# Patient Record
Sex: FEMALE | Race: WHITE
[De-identification: names, ages, dates, MRNs, and addresses within clinical notes are randomized per-mention and may not be internally consistent; named-entity substitution may affect disease eponyms.]

---

## 2019-11-25 ENCOUNTER — HOSPITAL ENCOUNTER (EMERGENCY)
Dept: HOSPITAL 25 - UCEAST | Age: 44
Discharge: HOME | End: 2019-11-25
Payer: COMMERCIAL

## 2019-11-25 VITALS — DIASTOLIC BLOOD PRESSURE: 94 MMHG | SYSTOLIC BLOOD PRESSURE: 175 MMHG

## 2019-11-25 DIAGNOSIS — Z88.5: ICD-10-CM

## 2019-11-25 DIAGNOSIS — F17.200: ICD-10-CM

## 2019-11-25 DIAGNOSIS — I10: ICD-10-CM

## 2019-11-25 DIAGNOSIS — K08.89: Primary | ICD-10-CM

## 2019-11-25 PROCEDURE — 96372 THER/PROPH/DIAG INJ SC/IM: CPT

## 2019-11-25 PROCEDURE — 99212 OFFICE O/P EST SF 10 MIN: CPT

## 2019-11-25 PROCEDURE — G0463 HOSPITAL OUTPT CLINIC VISIT: HCPCS

## 2019-11-25 NOTE — UC
UC Dental HPI





- HPI Summary


HPI Summary: 





45 yo female with worsening left sided dental pain x days


has not seen a dentist for >10 yrs


hx htn


no fever


multiple missing and cracked teeth


grinds teeth at night


no facial swelling





- History of Current Complaint


Chief Complaint: UCDentalProblem


Stated Complaint: tooth ache


Time Seen by Provider: 11/25/19 15:41


Onset/Duration: Gradual Onset, Lasting Weeks, Worse Since - 3-4 days


Severity: Severe


Pain Intensity: 8


Pain Scale Used: 0-10 Numeric


Aggravating Factor(s): Heat, Cold, Chewing


Alleviating Factor(s): Nothing





- Allergies/Home Medications


Allergies/Adverse Reactions: 


 Allergies











Allergy/AdvReac Type Severity Reaction Status Date / Time


 


MS Codeine [Codeine] Allergy  heart races Verified 10/28/14 07:13











Home Medications: 


 Home Medications





Blood Preesure  11/25/19 [History]


Escitalopram * [Lexapro *] 1 tab PO DAILY 11/25/19 [History Confirmed 11/25/19]











PMH/Surg Hx/FS Hx/Imm Hx


Previously Healthy: Yes


Cardiovascular History: Hypertension





- Surgical History


Surgical History: Yes


Surgery Procedure, Year, and Place: TUBAL.  D&C





- Family History


Known Family History: Positive: Hypertension


   Negative: Diabetes





- Social History


Alcohol Use: Daily


Substance Use Type: None


Smoking Status (MU): Heavy Every Day Tobacco Smoker





Review of Systems


All Other Systems Reviewed And Are Negative: Yes


Constitutional: Positive: Negative


Skin: Positive: Negative


Eyes: Positive: Negative


ENT: Positive: Dental Pain


Respiratory: Positive: Negative


Cardiovascular: Positive: Negative


Gastrointestinal: Positive: Negative


Genitourinary: Positive: Negative


Motor: Positive: Negative


Neurovascular: Positive: Negative


Musculoskeletal: Positive: Negative


Neurological: Positive: Negative


Psychological: Positive: Negative





Physical Exam


Triage Information Reviewed: Yes


Appearance: Well-Appearing, No Pain Distress, Well-Nourished


Vital Signs: 


 Initial Vital Signs











Temp  98.8 F   11/25/19 15:24


 


Pulse  98   11/25/19 15:24


 


Resp  16   11/25/19 15:24


 


BP  175/94   11/25/19 15:24


 


Pulse Ox  98   11/25/19 15:24











Vital Signs Reviewed: Yes


Eyes: Positive: Conjunctiva Clear


ENT: Positive: Hearing grossly normal, Uvula midline.  Negative: Nasal 

congestion, Nasal drainage, Tonsillar swelling, Tonsillar exudate, Trismus, 

Muffled voice, Hoarse voice


Dental Exam: Other - abysmal dentition


Neck: Positive: Supple, Enlarged Nodes @ - LEFT ANT CERVICAL/TENDER


Respiratory: Positive: Lungs clear, Normal breath sounds, No respiratory 

distress, No accessory muscle use


Cardiovascular: Positive: RRR, No Murmur


Abdomen Description: Positive: Nontender, No Organomegaly, Soft


Bowel Sounds: Positive: Present


Musculoskeletal: Positive: ROM Intact, No Edema


Neurological: Positive: Alert, Muscle Tone Normal


Psychological Exam: Normal


Skin Exam: Normal





Dental Complaint Course/Dx





- Differential Dx/Diagnosis


Provider Diagnosis: 


 Dentalgia








Discharge ED





- Sign-Out/Discharge


Documenting (check all that apply): Patient Departure


All imaging exams completed and their final reports reviewed: No Studies





- Discharge Plan


Condition: Stable


Disposition: HOME


Prescriptions: 


Naproxen [Naproxen 500 mg tab] 500 mg PO BID PRN #20 tablet


 PRN Reason: Pain


Patient Education Materials:  Toothache (ED)


Referrals: 


Rosey Geiger MD [Primary Care Provider] - 





- Billing Disposition and Condition


Condition: STABLE


Disposition: Home

## 2019-11-25 NOTE — XMS REPORT
Continuity of Care Document (CCD)

 Created on:2019



Patient:Linnette Florentino

Sex:Female

:1975

External Reference #:[id number]





Demographics







 Address  [address] [address] [address]

 

 Home Phone  [phone]

 

 Work Phone  [phone]

 

 Preferred Language  en

 

 Marital Status  Not  or 

 

 Lutheran Affiliation  Unknown

 

 Race  White

 

 Ethnic Group  Not  or 









Author







 Name  Robe Baeza PA

 

 Address  [address] [place] [address]









Problems







 Active Problems  Provider  Date

 

 Moderate recurrent major depression  Rosey Hoffmann MD  Onset: 2011

 

 Peptic reflux disease  Rosey Hoffmann MD  Onset: 2011

 

 Generalized anxiety disorder  Meghann Powers MD  Onset: 2015

 

 Chronic sinusitis  Meghann Powers MD  Onset: 2015

 

 Tobacco user  Meghann Powers MD  Onset: 2015

 

 Bruxism (teeth grinding)  Meghann Powers MD  Onset: 2015

 

 Pure hypercholesterolemia  Meghann Powers MD  Onset: 2015







Social History







 Type  Date  Description  Comments

 

 Birth Sex    Unknown  

 

 Tobacco Use  Start: Unknown  Current Cigarette Smoker  1 Pack Daily  

 

 ETOH Use    Occasionally consumes alcohol  

 

 Tobacco Use  Start: Unknown  Heavy tobacco smoker (more than 10  



     cigarettes/day)  

 

 Smoking Status  Reviewed: 10/30/19  Heavy tobacco smoker (more than 10  



     cigarettes/day)  







Allergies, Adverse Reactions, Alerts







 Active Allergies  Reaction  Severity  Comments  Date

 

 Tylenol #3      Heart Races  2005







Medications







 Active Medications  SIG  Qnty  Indications  Ordering  Date



         Provider  

 

 Montelukast Sodium  1 by mouth every  90tabs  J30.9  Rosey Hoffmann  10/30/
2019



                  10mg  day      MD DIONNE  



 Tablets          



           

 

 Cetirizine  1 po q day  90tabs  J30.9  Rosey Hoffmann  10/30/2019



 HCL/Pseudoephedrine        MD DIONNE  



 HCL ER          



      5-120mg Tablets          



 ER 12HR          



           

 

 Albuterol Sulfate HFA  Inhale 2 Puffs  8.5units  R06.2  Jessie Velez  2019



   By Mouth 4 Times      C, RN MS FNP  



 108(90Base) mcg/Act  Daily as Needed        



 Aerosol          



           

 

 Amlodipine Besylate  Take 1 Tablet By  30tabs  I10  Robe Baeza PA  2018



                   5mg  Mouth Every Day        



 Tablets          



           

 

 Metoprolol Succinate  Take 1 Tablet By  30tabs  I10  Jessie Velez  2018



 ER  Mouth Every Day      ROMINA RN MS FNP  



  50mg Tablets ER 24HR          



           

 

 Propranolol HCL  1 tabs every 6  30tabs  F41.1  Jessie Velez  2018



               10mg  hours as needed      SCOTT VANEGAS MS SEANP  



 Tablets  for anxiety        



           

 

 Citalopram  Take 1 & 1/2  45tabs  F33.1  Jessie Velez  2017



 Hydrobromide  Tablets By Mouth      ROMINA RN MS FNP  



            20mg  Every Day        



 Tablets          



           

 

 Fluticasone  two spray per  16gm  J30.9  Jessie Velez  2017



 Propionate  each nostril      ROMINA RN MS FNP  



          50mcg/Act  every day        



 Suspension          



           

 

 Chantix Starting  to start 1 week  1tabs  F17.200  Rosey Hoffmann  2015



 Month Dillon  before target      M,MD  



         0.5mg X 11 &  quit day; day        



 1 mg X 42 Tablets  1-3: 0.5mg qday;        



   day 4-7: 0.5mg        



   bid; day 7+ 1mg        



   twice a day for        



   11 weeks        









 F17.210









 Chantix Continuing  after the starter  1pack  F17.200  Meghann Powers,  2015



 Month Dillon  pack, continue 1 tab      MD  



       1mg Tablets  twice a day for 11        



   weeks. may extend tx        



   for another 12        



   weeks.        









 F17.210







Immunizations







 CPT Code  Status  Date  Vaccine  Lot #

 

 [id number]  Given  2017  Influenza Vac, Quadrivalent, Split, 0.5mL Dosage,  
[id number]



       Im Use  

 

 [id number]  Given  04/15/2004  Immunization Td 7 Yrs Or Older  









 









 [id number]  Refused  2019  Influenza Vac, Quadrivalent, Split, 0.5mL Dosage, 
Im Use  







Vital Signs







 Date  Vital  Result  Comment

 

 10/30/2019 11:32am  Body Temperature  98.4 F  









 Weight  165.12 lb  

 

 Heart Rate  98 /min  

 

 BP Systolic  145 mmHg  

 

 BP Diastolic  85 mmHg  

 

 Height  62 inches  5'2"

 

 BMI (Body Mass Index)  30.2 kg/m2  









 2019 10:08am  Weight  154.25 lb  









 Heart Rate  88 /min  

 

 BP Systolic  127 mmHg  

 

 BP Diastolic  76 mmHg  

 

 Height  62 inches  5'2"

 

 BMI (Body Mass Index)  28.2 kg/m2  







Results







 Test  Acquired Date  Facility  Test  Result  H/L  Range  Note

 

 Laboratory test  10/30/2019  [facility]  1 Rapid Flu  NEG      



 finding      Test (In        



       House)        







Procedures







 Date  Code  Description  Status

 

 2019  [id number]  Mammogram  Completed

 

 08/15/2018  [id number]  Mammogram  Completed

 

 10/02/2017  [id number]  Mammogram  Completed







Medical Devices







 Description

 

 No Information Available







Encounters







 Description

 

 No Information Available







Assessments







 Date  Code  Description  Provider

 

 10/30/2019  R49.0  Dysphonia  Robe Baeza PA

 

 10/30/2019  J30.9  Allergic rhinitis, unspecified  Robe Baeza PA

 

 10/30/2019  E66.9  Obesity, unspecified  Robe Baeza PA

 

 10/30/2019  Z68.30  Body mass index (BMI) 30.0-30.9, adult  Robe Baeza PA







Plan of Treatment

Future Appointment(s):2019  8:20 am - Jessie Velez RN MS FNP at 
[id number] - Robe Baeza PAR49.0 DysphoniaComments:likely due to viral 
illness or allergies.  should trend towards better.  denies GERD s/s.  DDx: if 
hoarseness continues, may want to consider radiology of chest with her h/o 
smoking -- Pancoast tumor.J30.9 Allergic rhinitis, unspecifiedNew Medication:
Montelukast Sodium 10 mg - 1 by mouth every dayCetirizine HCL/Pseudoephedrine 
HCL ER 5-120 mg - 1 po q dayComments:will add singulair to allergy meds.  ?
helpE66.9 Obesity, huihkobzqzaX81.30 Body mass index (BMI) 30.0-30.9, adult



Functional Status







 Description

 

 No Information Available







Mental Status







 Description

 

 No Information Available







Referrals







 Description

 

 No Information Available

## 2019-11-25 NOTE — XMS REPORT
Continuity of Care Document (CCD)

 Created on:2019



Patient:Linnette Florentino

Sex:Female

:1975

External Reference #:[id number]





Demographics







 Address  [address] [place] [address]

 

 Home Phone  [phone]

 

 Work Phone  [phone]

 

 Preferred Language  en

 

 Marital Status  Not  or 

 

 Zoroastrianism Affiliation  Unknown

 

 Race  White

 

 Ethnic Group  Not  or 









Author







 Name  Jessie Velez, RN MS FNP

 

 Address  [address] [place] [address]









Problems







 Active Problems  Provider  Date

 

 Moderate recurrent major depression  Rosey Hoffmann MD  Onset: 2011

 

 Peptic reflux disease  Rosey Hoffmann MD  Onset: 2011

 

 Generalized anxiety disorder  Meghann Powers MD  Onset: 2015

 

 Chronic sinusitis  Meghann Powers MD  Onset: 2015

 

 Tobacco user  Meghann Powers MD  Onset: 2015

 

 Bruxism (teeth grinding)  Meghann Powers MD  Onset: 2015

 

 Pure hypercholesterolemia  Meghann Powers MD  Onset: 2015







Social History







 Type  Date  Description  Comments

 

 Birth Sex    Unknown  

 

 Tobacco Use  Start: Unknown  Current Cigarette Smoker  1 Pack Daily  

 

 ETOH Use    Occasionally consumes alcohol  

 

 Tobacco Use  Start: Unknown  Heavy tobacco smoker (more than 10  



     cigarettes/day)  

 

 Smoking Status  Reviewed: 19  Heavy tobacco smoker (more than 10  



     cigarettes/day)  







Allergies, Adverse Reactions, Alerts







 Active Allergies  Reaction  Severity  Comments  Date

 

 Tylenol #3      Heart Races  2005







Medications







 Active Medications  SIG  Qnty  Indications  Ordering  Date



         Provider  

 

 Citalopram  1 by mouth every  30tabs  F33.1  Jessie Velez  2019



 Hydrobromide  graciela VANEGAS RN MS FNP  



            40mg          



 Tablets          



           

 

 Montelukast Sodium  1 by mouth every  90tabs  J30.9  Rosey Hoffmann  10/30/
2019



                  10mg  day      MD DIONNE  



 Tablets          



           

 

 Cetirizine  1 po q day  90tabs  J30.9  Rosey Hoffmann  10/30/2019



 HCL/Pseudoephedrine        MD DIONNE  



 HCL ER          



      5-120mg Tablets          



 ER 12HR          



           

 

 Albuterol Sulfate HFA  Inhale 2 Puffs  8.5units  R06.2  Jessie Velez  2019



   By Mouth 4 Times      ROMINA RN MS FNP  



 108(90Base) mcg/Act  Daily as Needed        



 Aerosol          



           

 

 Amlodipine Besylate  Take 1 Tablet By  30tabs  I10  Robe Baeza PA  2018



                   5mg  Mouth Every Day        



 Tablets          



           

 

 Metoprolol Succinate  Take 1 Tablet By  30tabs  I10  Jessie Velez  2018



 ER  Mouth Every Day      ROMINA RN MS FNP  



  50mg Tablets ER 24HR          



           

 

 Propranolol HCL  1 tabs every 6  30tabs  F41.1  Jessie Velez  2018



               10mg  hours as needed      ROMINA RN MS FNP  



 Tablets  for anxiety        



           

 

 Fluticasone  two spray per  16gm  J30.9  Jessie Velez  2017



 Propionate  each nostril      ROMINA RN MS FNP  



          50mcg/Act  every day        



 Suspension          



           

 

 Chantix Starting  to start 1 week  1tabs  F17.200  Rosey Hoffmann  2015



 Month Dillon  before target      M,MD  



         0.5mg X 11 &  quit day; day        



 1 mg X 42 Tablets  1-3: 0.5mg qday;        



   day 4-7: 0.5mg        



   bid; day 7+ 1mg        



   twice a day for        



   11 weeks        









 F17.210









 Chantix Continuing  after the starter  1pack  F17.200  Meghann Powers,  2015



 Month Dillon  pack, continue 1 tab      MD  



       1mg Tablets  twice a day for 11        



   weeks. may extend tx        



   for another 12        



   weeks.        









 F17.210







Immunizations







 CPT Code  Status  Date  Vaccine  Lot #

 

 [id number]  Given  2019  Influenza Vac, Quadrivalent, Split, 0.5mL Dosage,  
[id number]



       Im Use  

 

 [id number]  Given  2017  Influenza Vac, Quadrivalent, Split, 0.5mL Dosage,  
[id number]



       Im Use  

 

 [id number]  Given  04/15/2004  Immunization Td 7 Yrs Or Older  









 









 [id number]  Refused  2019  Influenza Vac, Quadrivalent, Split, 0.5mL Dosage, 
Im Use  







Vital Signs







 Date  Vital  Result  Comment

 

 2019  8:20am  Weight  167.00 lb  









 Heart Rate  103 /min  

 

 BP Systolic  146 mmHg  

 

 BP Diastolic  84 mmHg  









 10/30/2019 11:32am  Body Temperature  98.4 F  









 Weight  165.12 lb  

 

 Heart Rate  98 /min  

 

 BP Systolic  145 mmHg  

 

 BP Diastolic  85 mmHg  

 

 Height  62 inches  5'2"

 

 BMI (Body Mass Index)  30.2 kg/m2  







Results







 Test  Acquired Date  Facility  Test  Result  H/L  Range  Note

 

 Laboratory test  10/30/2019  [facility]  1 Rapid Flu  NEG      



 finding      Test (In        



       House)        







Procedures







 Date  Code  Description  Status

 

 2019  [id number]  Mammogram  Completed

 

 08/15/2018  [id number]  Mammogram  Completed

 

 10/02/2017  [id number]  Mammogram  Completed







Medical Devices







 Description

 

 No Information Available







Encounters







 Type  Date  Location  Provider  Dx  Diagnosis

 

 Office Visit  10/30/2019 11:40a  [facility]  Robe Baeza PA  R49.0  Dysphonia









 J30.9  Allergic rhinitis, unspecified

 

 E66.9  Obesity, unspecified

 

 Z68.30  Body mass index (BMI) 30.0-30.9, adult







Assessments







 Date  Code  Description  Provider

 

 2019  I10  Essential (primary) hypertension  Jessie Velez RN [facility]

 

 2019  J30.9  Allergic rhinitis, dianeified  Jessie Velez RN [facility]

 

 2019  F41.1  Generalized anxiety disorder  Jessie Velez RN [facility]

 

 2019  F33.1  Major depressive disorder, recurrent,  Jessie Velez RN [facility]



     moderate  

 

 2019  E55.9  Vitamin D deficiency, unspecified  Jessie Velez RN [facility]

 

 2019  F17.210  Nicotine dependence, cigarettes,  Jessie Velez RN 
[facility]



     uncomplicated  

 

 2019  Z23  Encounter for immunization  Jessie Velez RN [facility]

 

 10/30/2019  R49.0  Dysphonia  Robe Baeza PA

 

 10/30/2019  J30.9  Allergic rhinitis, unspecified  Robe Baeza PA

 

 10/30/2019  E66.9  Obesity, unspecified  Robe Baeza PA

 

 10/30/2019  Z68.30  Body mass index (BMI) 30.0-30.9,  Robe Baeza PA



     adult  







Plan of Treatment

Future Appointment(s):2020 10:00 am - Jessie Velez RN [facility] at 
[id number] - Jessie Velez RN [facility] Essential (primary) 
hypertensionComments:Discussed the need to decrease the caffine intake. Pt will 
work on that , decrease smoking goal is less than 130/ 80Follow up:6 mo for pe 
cstzzsiW57.9 Allergic rhinitis, qvjbuxdbjudA41.1 Generalized anxiety 
vbejfmooJ63.1 Major depressive disorder, recurrent, moderateNew Medication:
Citalopram Hydrobromide 40 mg - 1 by mouth every dayE55.9 Vitamin D deficiency, 
unspecifiedMiscellaneous:Vitamin d: take  2000iu daily in winter, this may help 
with depression and or mood as well as your ixunfE05.210 Nicotine dependence, 
cigarettes, [id number] Encounter for immunizationComments:FLU SHOT 
REVIEWED, BOTH BENEFITS AND POSSIBLE SE.  REVIEWED CONTRAINDICATIONS IEALLERGY 
TO EGGS, PREGNANCY, PREVIOUS ALLERGIC REACTION TO FLU SHOT. OFFERED [place] 
IN[place]ET. .



Functional Status







 Description

 

 No Information Available







Mental Status







 Description

 

 No Information Available







Referrals







 Description

 

 No Information Available